# Patient Record
Sex: MALE | Race: WHITE | Employment: FULL TIME | URBAN - METROPOLITAN AREA
[De-identification: names, ages, dates, MRNs, and addresses within clinical notes are randomized per-mention and may not be internally consistent; named-entity substitution may affect disease eponyms.]

---

## 2021-01-03 ENCOUNTER — HOSPITAL ENCOUNTER (EMERGENCY)
Facility: CLINIC | Age: 21
Discharge: HOME | End: 2021-01-03
Attending: EMERGENCY MEDICINE | Admitting: EMERGENCY MEDICINE
Payer: COMMERCIAL

## 2021-01-03 VITALS
DIASTOLIC BLOOD PRESSURE: 74 MMHG | OXYGEN SATURATION: 99 % | HEART RATE: 84 BPM | WEIGHT: 150 LBS | TEMPERATURE: 99 F | SYSTOLIC BLOOD PRESSURE: 129 MMHG | RESPIRATION RATE: 18 BRPM

## 2021-01-03 DIAGNOSIS — L02.02 FURUNCLE OF FACE: ICD-10-CM

## 2021-01-03 PROCEDURE — 99282 EMERGENCY DEPT VISIT SF MDM: CPT | Performed by: EMERGENCY MEDICINE

## 2021-01-03 PROCEDURE — 99284 EMERGENCY DEPT VISIT MOD MDM: CPT | Performed by: EMERGENCY MEDICINE

## 2021-01-03 RX ORDER — SULFAMETHOXAZOLE/TRIMETHOPRIM 800-160 MG
1 TABLET ORAL 2 TIMES DAILY
Qty: 14 TABLET | Refills: 0 | Status: SHIPPED | OUTPATIENT
Start: 2021-01-03 | End: 2021-01-10

## 2021-01-03 ASSESSMENT — ENCOUNTER SYMPTOMS
NUMBNESS: 1
RESPIRATORY NEGATIVE: 1
CONSTITUTIONAL NEGATIVE: 1

## 2021-01-03 NOTE — ED AVS SNAPSHOT
[facility] Emergency Dept  [address] [address]  Phone: [phone]  Fax: [phone]                                    Carlos A Gibbs   MRN: [id number]    Department: [facility] Emergency Dept   Date of Visit: 1/3/2021           After Visit Summary Signature Page    I have received my discharge instructions, and my questions have been answered. I have discussed any challenges I see with this plan with the nurse or doctor.    ..........................................................................................................................................  Patient/Patient Representative Signature      ..........................................................................................................................................  Patient Representative Print Name and Relationship to Patient    ..................................................               ................................................  Date                                   Time    ..........................................................................................................................................  Reviewed by Signature/Title    ...................................................              ..............................................  Date                                               Time          22EPIC Rev 08/18

## 2021-01-03 NOTE — ED PROVIDER NOTES
History     Chief Complaint   Patient presents with     Numbness     left face with swelling by mouth. denies dental pain or droop. started yesterday     The history is provided by the patient and medical records.     Carlos A Gibbs is a 20 year old male with history of acne who presents with a tender red papule or nodule in the left chin area with associated swelling and numbness in the area, which began yesterday.  No pointing or spontaneous drainage.  No fever or chills.  No dental or oral pain.  No neck swelling or tenderness.  No other acute complaints or concerns.    Allergies:  No Known Allergies    Problem List:    There are no active problems to display for this patient.       Past Medical History: Acne  History reviewed. No pertinent past medical history.    Past Surgical History:    History reviewed. No pertinent surgical history.    Family History:    History reviewed. No pertinent family history.    Social History:  Marital Status:  Single [1]  Social History     Tobacco Use     Smoking status: None   Substance Use Topics     Alcohol use: None     Drug use: None        Medications:         amoxicillin-clavulanate (AUGMENTIN) 875-125 MG tablet       sulfamethoxazole-trimethoprim (BACTRIM DS) 800-160 MG tablet        Review of Systems   Constitutional: Negative.    HENT: Negative.    Respiratory: Negative.    Skin: Negative for pallor and rash.   Neurological: Positive for numbness ( Left shin.).       Physical Exam   BP: 129/74  Pulse: 84  Temp: 99  F (37.2  C)  Resp: 18  Weight: 68 kg (150 lb)  SpO2: 99 %      Physical Exam  Vitals signs and nursing note reviewed.   Constitutional:       General: He is not in acute distress.     Appearance: Normal appearance. He is not ill-appearing.   HENT:      Head: Normocephalic and atraumatic.        Right Ear: External ear normal.      Left Ear: External ear normal.      Mouth/Throat:      Pharynx: Oropharynx is clear.   Eyes:      General: No scleral  icterus.        Right eye: No discharge.         Left eye: No discharge.      Extraocular Movements: Extraocular movements intact.      Conjunctiva/sclera: Conjunctivae normal.   Pulmonary:      Effort: Pulmonary effort is normal.      Breath sounds: Stridor present.   Skin:     General: Skin is warm and dry.      Findings: No rash.   Neurological:      General: No focal deficit present.      Mental Status: He is alert and oriented to person, place, and time.      Cranial Nerves: Cranial nerves are intact. No cranial nerve deficit ( 2-12 intact) or facial asymmetry.   Psychiatric:         Mood and Affect: Mood normal.         Behavior: Behavior normal.         ED Course        Procedures  With manual compression of the inflamed left chin nodule there appeared to be a tiny area of becerra pointing which was unroofed with an 18 ga. needle. No current indication for I&D.               No results found for this or any previous visit (from the past 24 hour(s)).    Medications - No data to display    Assessments & Plan (with Medical Decision Making)   20 year old male with history of acne who presents with a tender red papule or nodule in the left chin area with associated swelling and numbness in the area, which began yesterday.  No pointing or spontaneous drainage.  No fever or chills.  No dental or oral pain.  No neck swelling or tenderness. It appears to be an inflamed comedone without abscess. With manual compression there appeared to be a tiny area of becerra pointing which was unroofed with an 18 ga. needle. No current indication for I&D. Will rx Bactrim DS and Keflex and have use warm compresses. Clinic recheck if not improving over the next 24 hrs. He was provided instructions for supportive care and will return as needed for worsened condition or worsening symptoms, or new problems or concerns.       I have reviewed the nursing notes.    I have reviewed the findings, diagnosis, plan and need for follow up  with the patient.    Discharge Medication List as of 1/3/2021 11:23 AM      START taking these medications    Details   sulfamethoxazole-trimethoprim (BACTRIM DS) 800-160 MG tablet Take 1 tablet by mouth 2 times daily for 7 days, Disp-14 tablet, R-0, Local Print             Final diagnoses:   Furuncle of face       1/3/2021   [facility] EMERGENCY DEPT     Yaritza, Shantanu STEEL MD  01/05/21 [id number]